# Patient Record
Sex: MALE | Race: WHITE
[De-identification: names, ages, dates, MRNs, and addresses within clinical notes are randomized per-mention and may not be internally consistent; named-entity substitution may affect disease eponyms.]

---

## 2022-07-11 ENCOUNTER — HOSPITAL ENCOUNTER (EMERGENCY)
Dept: HOSPITAL 26 - MED | Age: 22
Discharge: HOME | End: 2022-07-11
Payer: COMMERCIAL

## 2022-07-11 VITALS — WEIGHT: 140 LBS | HEIGHT: 67 IN | BODY MASS INDEX: 21.97 KG/M2

## 2022-07-11 VITALS — DIASTOLIC BLOOD PRESSURE: 69 MMHG | SYSTOLIC BLOOD PRESSURE: 128 MMHG

## 2022-07-11 VITALS — SYSTOLIC BLOOD PRESSURE: 122 MMHG | DIASTOLIC BLOOD PRESSURE: 62 MMHG

## 2022-07-11 DIAGNOSIS — Z79.899: ICD-10-CM
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
## 2022-07-11 DIAGNOSIS — S93.401A: Primary | ICD-10-CM

## 2022-07-11 NOTE — NUR
20 y/o male bib mother from home, c/o right ankle pain post basketball injury 
yesterday. denies loc, syncope, and head injury. skin is pink/warm/dry. a&o x4, 
ambulates, unable to bear full weight on extremity. lungs clear bl, heart rate 
even and regular. pt denies any fever, cp, sob, or cough at this time. pt 
states pain is 8/10 at this time. vss. patient positioned for comfort. hob 
elevated. bed down. ermd made aware of pt.



[facility]: denies

nka

med: denies

## 2022-07-11 NOTE — NUR
Patient discharged with v/s stable. Written and verbal after care instructions 
given and explained. 

Patient alert, oriented and verbalized understanding of instructions. 
Ambulatory with steady gait. All questions addressed prior to discharge. ID 
band removed. Patient advised to follow up with PMD. Rx of IBU given. Patient 
educated on indication of medication including possible reaction and side 
effects. Opportunity to ask questions provided and answered.